# Patient Record
Sex: FEMALE | Race: WHITE | HISPANIC OR LATINO | URBAN - METROPOLITAN AREA
[De-identification: names, ages, dates, MRNs, and addresses within clinical notes are randomized per-mention and may not be internally consistent; named-entity substitution may affect disease eponyms.]

---

## 2022-12-04 ENCOUNTER — HOSPITAL ENCOUNTER (EMERGENCY)
Facility: HOSPITAL | Age: 67
Discharge: HOME | End: 2022-12-04
Attending: EMERGENCY MEDICINE
Payer: MEDICARE

## 2022-12-04 VITALS
TEMPERATURE: 98 F | BODY MASS INDEX: 22.2 KG/M2 | HEIGHT: 64 IN | WEIGHT: 130 LBS | OXYGEN SATURATION: 99 % | SYSTOLIC BLOOD PRESSURE: 122 MMHG | DIASTOLIC BLOOD PRESSURE: 86 MMHG | HEART RATE: 94 BPM | RESPIRATION RATE: 20 BRPM
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
## 2022-12-04 DIAGNOSIS — F10.920 ALCOHOLIC INTOXICATION WITHOUT COMPLICATION: ICD-10-CM

## 2022-12-04 LAB
ALBUMIN SERPL BCP-MCNC: 3.9 G/DL (ref 3.5–5.2)
ALP SERPL-CCNC: 56 U/L (ref 55–135)
ALT SERPL W/O P-5'-P-CCNC: 14 U/L (ref 10–44)
ANION GAP SERPL CALC-SCNC: 15 MMOL/L (ref 8–16)
AST SERPL-CCNC: 22 U/L (ref 10–40)
BASOPHILS # BLD AUTO: 0.06 K/UL (ref 0–0.2)
BASOPHILS NFR BLD: 1.2 % (ref 0–1.9)
BILIRUB SERPL-MCNC: 0.3 MG/DL (ref 0.1–1)
BUN SERPL-MCNC: 11 MG/DL (ref 8–23)
CALCIUM SERPL-MCNC: 8.9 MG/DL (ref 8.7–10.5)
CHLORIDE SERPL-SCNC: 105 MMOL/L (ref 95–110)
CO2 SERPL-SCNC: 21 MMOL/L (ref 23–29)
CREAT SERPL-MCNC: 0.7 MG/DL (ref 0.5–1.4)
DIFFERENTIAL METHOD: NORMAL
EOSINOPHIL # BLD AUTO: 0.1 K/UL (ref 0–0.5)
EOSINOPHIL NFR BLD: 1.6 % (ref 0–8)
ERYTHROCYTE [DISTWIDTH] IN BLOOD BY AUTOMATED COUNT: 13.4 % (ref 11.5–14.5)
EST. GFR  (NO RACE VARIABLE): >60 ML/MIN/1.73 M^2
ETHANOL SERPL-MCNC: 273 MG/DL
GLUCOSE SERPL-MCNC: 100 MG/DL (ref 70–110)
HCT VFR BLD AUTO: 38.7 % (ref 37–48.5)
HGB BLD-MCNC: 12.8 G/DL (ref 12–16)
IMM GRANULOCYTES # BLD AUTO: 0.01 K/UL (ref 0–0.04)
IMM GRANULOCYTES NFR BLD AUTO: 0.2 % (ref 0–0.5)
LYMPHOCYTES # BLD AUTO: 1.5 K/UL (ref 1–4.8)
LYMPHOCYTES NFR BLD: 30 % (ref 18–48)
MCH RBC QN AUTO: 29.9 PG (ref 27–31)
MCHC RBC AUTO-ENTMCNC: 33.1 G/DL (ref 32–36)
MCV RBC AUTO: 90 FL (ref 82–98)
MONOCYTES # BLD AUTO: 0.3 K/UL (ref 0.3–1)
MONOCYTES NFR BLD: 6.6 % (ref 4–15)
NEUTROPHILS # BLD AUTO: 3.1 K/UL (ref 1.8–7.7)
NEUTROPHILS NFR BLD: 60.4 % (ref 38–73)
NRBC BLD-RTO: 0 /100 WBC
PLATELET # BLD AUTO: 228 K/UL (ref 150–450)
PMV BLD AUTO: 10.7 FL (ref 9.2–12.9)
POTASSIUM SERPL-SCNC: 4.1 MMOL/L (ref 3.5–5.1)
PROT SERPL-MCNC: 7.3 G/DL (ref 6–8.4)
RBC # BLD AUTO: 4.28 M/UL (ref 4–5.4)
SODIUM SERPL-SCNC: 141 MMOL/L (ref 136–145)
WBC # BLD AUTO: 5.14 K/UL (ref 3.9–12.7)

## 2022-12-04 PROCEDURE — 85025 COMPLETE CBC W/AUTO DIFF WBC: CPT | Performed by: EMERGENCY MEDICINE

## 2022-12-04 PROCEDURE — 99285 EMERGENCY DEPT VISIT HI MDM: CPT | Mod: 25

## 2022-12-04 PROCEDURE — 82077 ASSAY SPEC XCP UR&BREATH IA: CPT | Performed by: EMERGENCY MEDICINE

## 2022-12-04 PROCEDURE — 25000003 PHARM REV CODE 250: Performed by: EMERGENCY MEDICINE

## 2022-12-04 PROCEDURE — 80053 COMPREHEN METABOLIC PANEL: CPT | Performed by: EMERGENCY MEDICINE

## 2022-12-04 RX ORDER — METHOCARBAMOL 500 MG/1
1000 TABLET, FILM COATED ORAL 3 TIMES DAILY PRN
Qty: 30 TABLET | Refills: 0 | Status: SHIPPED | OUTPATIENT
Start: 2022-12-04 | End: 2022-12-09

## 2022-12-04 RX ORDER — ACETAMINOPHEN 500 MG
500 TABLET ORAL EVERY 8 HOURS PRN
Qty: 20 TABLET | Refills: 0 | Status: SHIPPED | OUTPATIENT
Start: 2022-12-04

## 2022-12-04 RX ORDER — SODIUM CHLORIDE 9 MG/ML
1000 INJECTION, SOLUTION INTRAVENOUS
Status: COMPLETED | OUTPATIENT
Start: 2022-12-04 | End: 2022-12-04

## 2022-12-04 RX ADMIN — SODIUM CHLORIDE 1000 ML: 0.9 INJECTION, SOLUTION INTRAVENOUS at 04:12

## 2022-12-04 NOTE — ED PROVIDER NOTES
Encounter Date: 12/4/2022       History     Chief Complaint   Patient presents with    Fall     Patient has been drinking had a witnessed fall back and hit the back of her head on a bench, no loc and does not take blood thinners, +vomiting. +complaints of HA. Orientated to self, time only disoriented to place and situation. Awake and with decreased LOC.      Patient is a 67-year-old female presents to the ED with complaint of fall.  Patient's daughter at bedside states that the patient had been drinking earlier today at a brunch when she stood up, felt weak and fell backwards hitting her head on concrete.  She does report 1 episode of vomiting after the fall and now complains of headache.  She denies any LOC. she denies any preceding chest pain or shortness of breath.  The daughter states the patient typically does not drink and thinks that she may have over indulged today.    Review of patient's allergies indicates:  No Known Allergies  No past medical history on file.  No past surgical history on file.  No family history on file.     Review of Systems   Constitutional:  Negative for chills and fever.   Eyes:  Negative for visual disturbance.   Respiratory:  Negative for shortness of breath.    Cardiovascular:  Negative for chest pain, palpitations and leg swelling.   Gastrointestinal:  Positive for nausea and vomiting. Negative for abdominal pain.   Musculoskeletal:  Positive for neck pain. Negative for back pain and neck stiffness.   Neurological:  Positive for headaches. Negative for dizziness, tremors, syncope and light-headedness.   Psychiatric/Behavioral:  Negative for agitation and confusion.      Physical Exam     Initial Vitals [12/04/22 1551]   BP Pulse Resp Temp SpO2   116/64 92 16 98.4 °F (36.9 °C) 96 %      MAP       --         Physical Exam    Nursing note and vitals reviewed.  Constitutional: She appears well-developed and well-nourished.   HENT:   Head: Normocephalic.   Right Ear: External ear  normal.   Left Ear: External ear normal.   No Olvera sign  No hemotympanum  TMs are clear without blood or otorrhea   No mastoid tenderness  Scalp is free of laceration or other deformity  Mid face is stable and free of tenderness on palpation  No malocclusion noted  No septal hematoma or rhinorrhea noted      Eyes: Conjunctivae and EOM are normal. Pupils are equal, round, and reactive to light.   Neck:   C collar in place    Cardiovascular:  Normal rate, regular rhythm, normal heart sounds and intact distal pulses.           Pulmonary/Chest: Breath sounds normal.   Abdominal: Abdomen is soft. Bowel sounds are normal.   Musculoskeletal:         General: Normal range of motion.      Comments: Complete and thorough palpation of entire spinal column without obvious deformity, bony tenderness or step offs.        Neurological: She is alert and oriented to person, place, and time. She has normal strength. GCS score is 15. GCS eye subscore is 4. GCS verbal subscore is 5. GCS motor subscore is 6.   Skin: Skin is warm. Capillary refill takes less than 2 seconds.   Psychiatric: She has a normal mood and affect.       ED Course   Procedures  Labs Reviewed   COMPREHENSIVE METABOLIC PANEL - Abnormal; Notable for the following components:       Result Value    CO2 21 (*)     All other components within normal limits   ALCOHOL,MEDICAL (ETHANOL) - Abnormal; Notable for the following components:    Alcohol, Serum 273 (*)     All other components within normal limits   CBC W/ AUTO DIFFERENTIAL          Imaging Results              CT Cervical Spine Without Contrast (Final result)  Result time 12/04/22 18:12:45      Final result by Tim De La Torre DO (12/04/22 18:12:45)                   Impression:      No acute fracture or subluxation of the cervical spine.    Multilevel degenerative changes of the cervical spine as above.      Electronically signed by: Tim De La Torre  Date:    12/04/2022  Time:    18:12               Narrative:     EXAMINATION:  CT CERVICAL SPINE WITHOUT CONTRAST    CLINICAL HISTORY:  Neck trauma (Age >= 65y);    TECHNIQUE:  Low dose axial images, sagittal and coronal reformations were performed though the cervical spine without intravenous contrast.    COMPARISON:  None available.    FINDINGS:  Alignment: There is straightening of the usual cervical lordosis.  Alignment otherwise normal.    Vertebra: There is no acute fracture or subluxation of the cervical spine.  The vertebral body heights are maintained.    Discs: There is moderate disc height loss C5-C6 and mild disc height loss at C6-C7.    Degenerative changes: Mild multilevel degenerative changes of the cervical spine, most significant the levels of C5-C6 and C6-C7.  At C5-C6 there is a posterior disc bulge and bilateral uncovertebral joint spurring resulting in mild to moderate left neural foraminal narrowing.  No spinal canal stenosis.  At C6-C7 there is a mild posterior disc bulge and mild bilateral facet arthropathy and uncovertebral joint spurring, resulting in mild left neural foraminal narrowing and mild spinal canal stenosis.  Mild scattered facet arthropathy elsewhere.    Miscellaneous: There is nonspecific heterogeneity of the bilateral thyroid lobes which can be further evaluated nonemergent thyroid ultrasound if clinically indicated. The visualized lung apices are clear.                                       CT Head Without Contrast (Final result)  Result time 12/04/22 18:09:40      Final result by Tim D eLa Torre DO (12/04/22 18:09:40)                   Impression:      No acute intracranial abnormality.      Electronically signed by: Tim De La Torre  Date:    12/04/2022  Time:    18:09               Narrative:    EXAMINATION:  CT HEAD WITHOUT CONTRAST    CLINICAL HISTORY:  Facial trauma, blunt;    TECHNIQUE:  Low dose axial CT images obtained throughout the head without intravenous contrast. Sagittal and coronal reconstructions were  performed.    COMPARISON:  None available.    FINDINGS:  Ventricles and sulci are normal in size for age without evidence of hydrocephalus. No extra-axial blood or fluid collections.  The brain parenchyma is normal. No parenchymal mass, hemorrhage, edema or major vascular distribution infarct.    No calvarial fracture.  The scalp is unremarkable.  Bilateral paranasal sinuses and mastoid air cells are clear.                                       Medications   0.9%  NaCl infusion (1,000 mLs Intravenous New Bag 12/4/22 1635)     Medical Decision Making:   ED Management:  - CT head WO negative for acute intracranial abnormality per final radiology read  - CT cervical spine WO negative for acute osseous abnormality per final radiology read  - pt removed C collar; she was not instructed to do this  - pt able to ambulate in ED without difficulty  - pt to be discharged in the care of her daughter  - will discharge to home with rx for Robaxin, acetaminophen   - pt stable for discharge at this time; she is comfortable with plan for discharge  - No further intervention is indicated at this time after having taken into account the patient's history, physical exam findings, and empirical and objective data obtained during the patient's emergency department workup.   - The patient is at low risk for an emergent medical condition at this time, and I am of the belief that that it is safe to discharge the patient from the emergency department.   - The patient is instructed to follow up as outpatient as indicated on the discharge paperwork.    - I have discussed the specifics of the workup with the patient and the patient has verbalized understanding of the details of the workup, the diagnosis, the treatment plan, and the need for outpatient follow-up.    - Although the patient has no emergent etiology today this does not preclude the development of an emergent condition so, in addition, I have advised the patient that they can  return to the ED and/or activate EMS at any time with worsening of their symptoms, change of their symptoms, or with any other medical complaint.    - The patient remained comfortable and stable during their visit in the ED.    - Discharge and follow-up instructions discussed with the patient who expressed understanding and willingness to comply with my recommendations.  - Results of all emergency department tests  discussed thoroughly with patient; all patient questions answered; pt in agreement with plan  - Pt instructed to follow up with PCP in 2-3 days for recheck of today's complaints  - Pt given strict emergency department return precautions for any new or worsening of symptoms  - Pt discharged from the emergency department in stable condition, in no acute distress              ED Course as of 12/04/22 1903   Sun Dec 04, 2022   1847 On re-evaluation, patient states that she now as back pain to the midthoracic back.  Patient initially denied on initial presentation. [LC]   1848 Patient removed C-collar. [LC]   1854 On reassessment, pt declined thoracic xray.  [LC]      ED Course User Index  [LC] Rudy Jerez MD                   Clinical Impression:   Final diagnoses:  [W19.XXXA] Fall, initial encounter (Primary)  [F10.920] Alcoholic intoxication without complication      ED Disposition Condition    Discharge Stable          ED Prescriptions       Medication Sig Dispense Start Date End Date Auth. Provider    methocarbamoL (ROBAXIN) 500 MG Tab Take 2 tablets (1,000 mg total) by mouth 3 (three) times daily as needed (muscle pain). 30 tablet 12/4/2022 12/9/2022 Rudy Jerez MD    acetaminophen (TYLENOL) 500 MG tablet Take 1 tablet (500 mg total) by mouth every 8 (eight) hours as needed for Pain. 20 tablet 12/4/2022 -- Rudy Jerez MD          Follow-up Information       Follow up With Specialties Details Why Contact Info    Your primary care physician                 Rudy Jerez  MD  12/04/22 3233

## 2022-12-04 NOTE — ED NOTES
No lacerations noted to scalp upon inspection of head and neck. Posterior scalp tender to palpation.